# Patient Record
Sex: MALE | Race: OTHER | NOT HISPANIC OR LATINO | URBAN - METROPOLITAN AREA
[De-identification: names, ages, dates, MRNs, and addresses within clinical notes are randomized per-mention and may not be internally consistent; named-entity substitution may affect disease eponyms.]

---

## 2019-07-12 ENCOUNTER — EMERGENCY (EMERGENCY)
Facility: HOSPITAL | Age: 44
LOS: 1 days | Discharge: HOME | End: 2019-07-12
Attending: EMERGENCY MEDICINE
Payer: SELF-PAY

## 2019-07-12 VITALS — SYSTOLIC BLOOD PRESSURE: 162 MMHG | RESPIRATION RATE: 18 BRPM | DIASTOLIC BLOOD PRESSURE: 95 MMHG | HEART RATE: 63 BPM

## 2019-07-12 VITALS
TEMPERATURE: 98 F | OXYGEN SATURATION: 97 % | SYSTOLIC BLOOD PRESSURE: 203 MMHG | WEIGHT: 259.93 LBS | HEART RATE: 88 BPM | HEIGHT: 69 IN | RESPIRATION RATE: 18 BRPM | DIASTOLIC BLOOD PRESSURE: 126 MMHG

## 2019-07-12 PROCEDURE — 73030 X-RAY EXAM OF SHOULDER: CPT

## 2019-07-12 PROCEDURE — 73030 X-RAY EXAM OF SHOULDER: CPT | Mod: 26,LT

## 2019-07-12 PROCEDURE — 73130 X-RAY EXAM OF HAND: CPT | Mod: 26,LT

## 2019-07-12 PROCEDURE — 29125 APPL SHORT ARM SPLINT STATIC: CPT | Mod: LT

## 2019-07-12 PROCEDURE — 99283 EMERGENCY DEPT VISIT LOW MDM: CPT | Mod: 25

## 2019-07-12 PROCEDURE — 29125 APPL SHORT ARM SPLINT STATIC: CPT

## 2019-07-12 PROCEDURE — 73130 X-RAY EXAM OF HAND: CPT

## 2019-07-12 PROCEDURE — 73080 X-RAY EXAM OF ELBOW: CPT

## 2019-07-12 PROCEDURE — 73080 X-RAY EXAM OF ELBOW: CPT | Mod: 26,LT

## 2019-07-12 PROCEDURE — 99284 EMERGENCY DEPT VISIT MOD MDM: CPT | Mod: 25

## 2019-07-12 RX ORDER — IBUPROFEN 200 MG
600 TABLET ORAL ONCE
Refills: 0 | Status: COMPLETED | OUTPATIENT
Start: 2019-07-12 | End: 2019-07-12

## 2019-07-12 RX ORDER — ACETAMINOPHEN 500 MG
975 TABLET ORAL ONCE
Refills: 0 | Status: COMPLETED | OUTPATIENT
Start: 2019-07-12 | End: 2019-07-12

## 2019-07-12 RX ADMIN — Medication 600 MILLIGRAM(S): at 17:35

## 2019-07-12 RX ADMIN — Medication 975 MILLIGRAM(S): at 17:35
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
## 2019-07-12 NOTE — ED PROCEDURE NOTE - NS ED PERI NEURO NEG
H&P reviewed. The patient was examined and there are no changes to the H&P.       The patient/caregiver verbalized understanding of how to care for the injured extremity with splint/Pre-application: Motor, sensory, and vascular responses intact in the injured extremity./Post-application: Motor, sensory, and vascular responses intact in the injured extremity.
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
## 2019-07-12 NOTE — ED PROVIDER NOTE - NSFOLLOWUPINSTRUCTIONS_ED_ALL_ED_FT
Ice to pain area.  Elevate the affected area.  Keep the splint.  Take Motrin (Advil or Ibuprofen) 600mg every 8hours for pain with food.  Tylenol 500mg or 650mg every 6hours for pain as needed.  Follow up with your orthopedist or Dr. on a list, call Monday for an appointment.   Return for any concerns or worsening symptoms.
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
## 2019-07-13 NOTE — ED POST DISCHARGE NOTE - DETAILS
7/13/19: attempted to contact pt regarding xray discrepancy as above and advised return to ED for shoulder sling placement. No answer, left voice message with callback to admin line. - Jone Suggs PA-C 7/14/19: Left voicemail informing that I had results to discuss with the patient, gave call back number. Called emergency contact #([phone]) and spoke with patient's daughter who wrote down number for admin line to relay to her father. -Karri Dubois PA-C no answer, will send certified letter - Deepali Patino PA-C

## 2021-07-14 ENCOUNTER — RX RENEWAL (OUTPATIENT)
Age: 46
End: 2021-07-14

## 2021-07-14 PROBLEM — Z00.00 ENCOUNTER FOR PREVENTIVE HEALTH EXAMINATION: Status: ACTIVE | Noted: 2021-07-14

## 2021-08-30 ENCOUNTER — APPOINTMENT (OUTPATIENT)
Dept: INTERNAL MEDICINE | Facility: CLINIC | Age: 46
End: 2021-08-30
Payer: MEDICAID

## 2021-08-30 VITALS
SYSTOLIC BLOOD PRESSURE: 146 MMHG | HEIGHT: 67 IN | TEMPERATURE: 97.9 F | BODY MASS INDEX: 45.08 KG/M2 | HEART RATE: 75 BPM | OXYGEN SATURATION: 98 % | RESPIRATION RATE: 17 BRPM | DIASTOLIC BLOOD PRESSURE: 92 MMHG | WEIGHT: 287.25 LBS

## 2021-08-30 DIAGNOSIS — I25.2 OLD MYOCARDIAL INFARCTION: ICD-10-CM

## 2021-08-30 DIAGNOSIS — Z78.9 OTHER SPECIFIED HEALTH STATUS: ICD-10-CM

## 2021-08-30 DIAGNOSIS — R93.89 ABNORMAL FINDINGS ON DIAGNOSTIC IMAGING OF OTHER SPECIFIED BODY STRUCTURES: ICD-10-CM

## 2021-08-30 PROCEDURE — 99214 OFFICE O/P EST MOD 30 MIN: CPT | Mod: 25

## 2021-08-30 PROCEDURE — 78268 UREA BREATH TEST C-14 ALYS: CPT

## 2021-08-30 RX ORDER — DOCUSATE SODIUM 100 MG/1
100 CAPSULE ORAL TWICE DAILY
Qty: 60 | Refills: 1 | Status: ACTIVE | COMMUNITY
Start: 2021-08-30 | End: 1900-01-01

## 2021-09-02 LAB — UREA BREATH TEST QL: NEGATIVE

## 2021-09-02 NOTE — HISTORY OF PRESENT ILLNESS
[FreeTextEntry1] : follow up [de-identified] : 44 y/o male presents with concerns for frequent episodes of GERD and constipation. Patient has a history of H-Pylori infection. He reports having a bowel movement every 1-2 weeks. He reports his diet consists of mainly rice and chicken- minimal fiber. He feels otherwise well and offers no other acute complaints or concerns. \par

## 2021-09-02 NOTE — REVIEW OF SYSTEMS
[Constipation] : constipation [Heartburn] : heartburn [Fever] : no fever [Chills] : no chills [Recent Change In Weight] : ~T no recent weight change [Vision Problems] : no vision problems [Earache] : no earache [Nasal Discharge] : no nasal discharge [Sore Throat] : no sore throat [Palpitations] : no palpitations [Chest Pain] : no chest pain [Shortness Of Breath] : no shortness of breath [Wheezing] : no wheezing [Abdominal Pain] : no abdominal pain [Nausea] : no nausea [Diarrhea] : no diarrhea [Vomiting] : no vomiting [Dysuria] : no dysuria [Hesitancy] : no hesitancy [Hematuria] : no hematuria [Frequency] : no frequency [Joint Pain] : no joint pain [Joint Swelling] : no joint swelling [Skin Rash] : no skin rash [Headache] : no headache [Dizziness] : no dizziness [Anxiety] : no anxiety [Depression] : no depression [Swollen Glands] : no swollen glands

## 2021-09-07 ENCOUNTER — LABORATORY RESULT (OUTPATIENT)
Age: 46
End: 2021-09-07

## 2021-09-07 ENCOUNTER — APPOINTMENT (OUTPATIENT)
Dept: INTERNAL MEDICINE | Facility: CLINIC | Age: 46
End: 2021-09-07
Payer: MEDICAID

## 2021-09-07 VITALS
HEART RATE: 76 BPM | WEIGHT: 288 LBS | SYSTOLIC BLOOD PRESSURE: 118 MMHG | HEIGHT: 69 IN | OXYGEN SATURATION: 97 % | BODY MASS INDEX: 42.65 KG/M2 | DIASTOLIC BLOOD PRESSURE: 78 MMHG | TEMPERATURE: 96.7 F | RESPIRATION RATE: 17 BRPM

## 2021-09-07 DIAGNOSIS — Z13.39 ENCOUNTER FOR SCREENING EXAM FOR OTHER MENTAL HEALTH AND BEHAVIORAL DISORDERS: ICD-10-CM

## 2021-09-07 DIAGNOSIS — Z13.31 ENCOUNTER FOR SCREENING FOR DEPRESSION: ICD-10-CM

## 2021-09-07 PROCEDURE — G0447 BEHAVIOR COUNSEL OBESITY 15M: CPT

## 2021-09-07 PROCEDURE — 93000 ELECTROCARDIOGRAM COMPLETE: CPT | Mod: 59

## 2021-09-07 PROCEDURE — 99396 PREV VISIT EST AGE 40-64: CPT | Mod: 25

## 2021-09-07 PROCEDURE — G0442 ANNUAL ALCOHOL SCREEN 15 MIN: CPT

## 2021-09-07 PROCEDURE — G0444 DEPRESSION SCREEN ANNUAL: CPT | Mod: 59

## 2021-09-07 PROCEDURE — 99215 OFFICE O/P EST HI 40 MIN: CPT | Mod: 25

## 2021-09-10 LAB
25(OH)D3 SERPL-MCNC: 26.9 NG/ML
ALBUMIN SERPL ELPH-MCNC: 4.5 G/DL
ALP BLD-CCNC: 185 U/L
ALT SERPL-CCNC: 84 U/L
ANION GAP SERPL CALC-SCNC: 11 MMOL/L
APPEARANCE: CLEAR
AST SERPL-CCNC: 42 U/L
BASOPHILS # BLD AUTO: 0.07 K/UL
BASOPHILS NFR BLD AUTO: 0.9 %
BILIRUB SERPL-MCNC: 0.4 MG/DL
BILIRUBIN URINE: NEGATIVE
BLOOD URINE: NORMAL
BUN SERPL-MCNC: 15 MG/DL
CALCIUM SERPL-MCNC: 9.5 MG/DL
CHLORIDE SERPL-SCNC: 101 MMOL/L
CHOLEST SERPL-MCNC: 153 MG/DL
CO2 SERPL-SCNC: 28 MMOL/L
COLOR: YELLOW
CREAT SERPL-MCNC: 1.04 MG/DL
EOSINOPHIL # BLD AUTO: 0.41 K/UL
EOSINOPHIL NFR BLD AUTO: 5.2 %
ESTIMATED AVERAGE GLUCOSE: 171 MG/DL
GLUCOSE QUALITATIVE U: NEGATIVE
GLUCOSE SERPL-MCNC: 145 MG/DL
HBA1C MFR BLD HPLC: 7.6 %
HCT VFR BLD CALC: 50 %
HDLC SERPL-MCNC: 33 MG/DL
HGB BLD-MCNC: 16.1 G/DL
KETONES URINE: NEGATIVE
LDLC SERPL CALC-MCNC: 85 MG/DL
LEUKOCYTE ESTERASE URINE: NEGATIVE
LYMPHOCYTES # BLD AUTO: 3.8 K/UL
LYMPHOCYTES NFR BLD AUTO: 47.8 %
MAN DIFF?: NORMAL
MCHC RBC-ENTMCNC: 28.7 PG
MCHC RBC-ENTMCNC: 32.2 GM/DL
MCV RBC AUTO: 89.1 FL
MONOCYTES # BLD AUTO: 0.21 K/UL
MONOCYTES NFR BLD AUTO: 2.6 %
NEUTROPHILS # BLD AUTO: 3.32 K/UL
NEUTROPHILS NFR BLD AUTO: 41.7 %
NITRITE URINE: NEGATIVE
NONHDLC SERPL-MCNC: 120 MG/DL
PH URINE: 5.5
PLATELET # BLD AUTO: 250 K/UL
POTASSIUM SERPL-SCNC: 4.2 MMOL/L
PROT SERPL-MCNC: 7.8 G/DL
PROTEIN URINE: ABNORMAL
PSA FREE FLD-MCNC: 54 %
PSA FREE SERPL-MCNC: 0.29 NG/ML
PSA SERPL-MCNC: 0.53 NG/ML
RBC # BLD: 5.61 M/UL
RBC # FLD: 13.5 %
SODIUM SERPL-SCNC: 140 MMOL/L
SPECIFIC GRAVITY URINE: 1.03
TRIGL SERPL-MCNC: 177 MG/DL
TSH SERPL-ACNC: 1.99 UIU/ML
UROBILINOGEN URINE: NORMAL
WBC # FLD AUTO: 7.96 K/UL

## 2021-09-13 PROBLEM — Z13.31 DEPRESSION SCREEN: Status: ACTIVE | Noted: 2021-09-13

## 2021-09-13 PROBLEM — Z13.39 ALCOHOL SCREENING: Status: ACTIVE | Noted: 2021-09-13

## 2021-09-13 NOTE — HEALTH RISK ASSESSMENT
[No] : No [0] : 2) Feeling down, depressed, or hopeless: Not at all (0) [] : No [de-identified] : time spent 1 minute  [Audit-CScore] : 0 [de-identified] : time spent 1 minute  [[id number]] : 0

## 2021-09-13 NOTE — HISTORY OF PRESENT ILLNESS
[FreeTextEntry1] : annual wellness  [de-identified] : 44 y/o male presents for annual wellness exam. He states that his constipation has been improving since starting Colace. He reports having still having SBD pain and hasn't had a BM in 3 days. He has not yet follow up with a gastroenterologist. He feels otherwise well and offers no other acute complaints or concerns.

## 2021-09-13 NOTE — REVIEW OF SYSTEMS
[Constipation] : constipation [Heartburn] : heartburn [Abdominal Pain] : abdominal pain [Fever] : no fever [Chills] : no chills [Recent Change In Weight] : ~T no recent weight change [Vision Problems] : no vision problems [Earache] : no earache [Nasal Discharge] : no nasal discharge [Sore Throat] : no sore throat [Chest Pain] : no chest pain [Palpitations] : no palpitations [Shortness Of Breath] : no shortness of breath [Wheezing] : no wheezing [Nausea] : no nausea [Diarrhea] : no diarrhea [Vomiting] : no vomiting [Dysuria] : no dysuria [Hesitancy] : no hesitancy [Hematuria] : no hematuria [Frequency] : no frequency [Joint Pain] : no joint pain [Joint Swelling] : no joint swelling [Skin Rash] : no skin rash [Headache] : no headache [Dizziness] : no dizziness [Anxiety] : no anxiety [Depression] : no depression [Swollen Glands] : no swollen glands

## 2021-09-16 ENCOUNTER — APPOINTMENT (OUTPATIENT)
Dept: INTERNAL MEDICINE | Facility: CLINIC | Age: 46
End: 2021-09-16
Payer: MEDICAID

## 2021-09-16 VITALS
SYSTOLIC BLOOD PRESSURE: 152 MMHG | OXYGEN SATURATION: 97 % | HEIGHT: 69 IN | BODY MASS INDEX: 42.36 KG/M2 | HEART RATE: 83 BPM | DIASTOLIC BLOOD PRESSURE: 98 MMHG | TEMPERATURE: 97.5 F | WEIGHT: 286 LBS | RESPIRATION RATE: 18 BRPM

## 2021-09-16 DIAGNOSIS — N40.0 BENIGN PROSTATIC HYPERPLASIA WITHOUT LOWER URINARY TRACT SYMPMS: ICD-10-CM

## 2021-09-16 DIAGNOSIS — N20.0 CALCULUS OF KIDNEY: ICD-10-CM

## 2021-09-16 DIAGNOSIS — R60.0 LOCALIZED EDEMA: ICD-10-CM

## 2021-09-16 PROCEDURE — 99401 PREV MED CNSL INDIV APPRX 15: CPT

## 2021-09-16 PROCEDURE — 99214 OFFICE O/P EST MOD 30 MIN: CPT | Mod: 25

## 2021-09-21 PROBLEM — N20.0 LEFT RENAL STONE: Status: ACTIVE | Noted: 2021-08-30

## 2021-09-21 PROBLEM — N40.0 BPH (BENIGN PROSTATIC HYPERPLASIA): Status: ACTIVE | Noted: 2021-07-14

## 2021-09-21 PROBLEM — R60.0 LOWER LEG EDEMA: Status: ACTIVE | Noted: 2021-08-30

## 2021-09-22 NOTE — REVIEW OF SYSTEMS
[Heartburn] : heartburn [Fever] : no fever [Chills] : no chills [Recent Change In Weight] : ~T no recent weight change [Vision Problems] : no vision problems [Earache] : no earache [Nasal Discharge] : no nasal discharge [Sore Throat] : no sore throat [Chest Pain] : no chest pain [Palpitations] : no palpitations [Shortness Of Breath] : no shortness of breath [Wheezing] : no wheezing [Abdominal Pain] : no abdominal pain [Nausea] : no nausea [Constipation] : no constipation [Diarrhea] : no diarrhea [Vomiting] : no vomiting [Dysuria] : no dysuria [Hesitancy] : no hesitancy [Hematuria] : no hematuria [Frequency] : no frequency [Joint Pain] : no joint pain [Joint Swelling] : no joint swelling [Skin Rash] : no skin rash [Headache] : no headache [Dizziness] : no dizziness [Anxiety] : no anxiety [Depression] : no depression [Swollen Glands] : no swollen glands

## 2021-09-22 NOTE — HISTORY OF PRESENT ILLNESS
[FreeTextEntry1] : Follow up on his lab results  [de-identified] : 46 y/o male presents for follow up and review of his lab results from his recent wellness exam. Patient's diabetes worsened and he is   not on any medications currently. Patient presents to initiate treatment. He feels otherwise well and offers no other acute complaints or concerns.

## 2021-12-07 NOTE — ED ADULT NURSE NOTE - GENITOURINARY WDL
Impression: Myopia, bilateral: H52.13. Plan: Give rx for glasses and contacts. Rtc 1 yr.
Bladder non-tender and non-distended. Urine clear yellow.
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
## 2022-09-19 ENCOUNTER — NON-APPOINTMENT (OUTPATIENT)
Age: 47
End: 2022-09-19

## 2022-09-19 ENCOUNTER — LABORATORY RESULT (OUTPATIENT)
Age: 47
End: 2022-09-19

## 2022-09-19 ENCOUNTER — APPOINTMENT (OUTPATIENT)
Dept: INTERNAL MEDICINE | Facility: CLINIC | Age: 47
End: 2022-09-19

## 2022-09-19 VITALS
WEIGHT: 280 LBS | RESPIRATION RATE: 18 BRPM | HEART RATE: 72 BPM | HEIGHT: 69 IN | SYSTOLIC BLOOD PRESSURE: 162 MMHG | DIASTOLIC BLOOD PRESSURE: 90 MMHG | TEMPERATURE: 97.5 F | BODY MASS INDEX: 41.47 KG/M2 | OXYGEN SATURATION: 96 %

## 2022-09-19 DIAGNOSIS — M94.0 CHONDROCOSTAL JUNCTION SYNDROME [TIETZE]: ICD-10-CM

## 2022-09-19 DIAGNOSIS — R79.89 OTHER SPECIFIED ABNORMAL FINDINGS OF BLOOD CHEMISTRY: ICD-10-CM

## 2022-09-19 DIAGNOSIS — Z00.00 ENCOUNTER FOR GENERAL ADULT MEDICAL EXAMINATION W/OUT ABNORMAL FINDINGS: ICD-10-CM

## 2022-09-19 PROCEDURE — 99214 OFFICE O/P EST MOD 30 MIN: CPT | Mod: 25

## 2022-09-19 PROCEDURE — 93000 ELECTROCARDIOGRAM COMPLETE: CPT

## 2022-09-19 PROCEDURE — 36415 COLL VENOUS BLD VENIPUNCTURE: CPT

## 2022-09-19 PROCEDURE — G0447 BEHAVIOR COUNSEL OBESITY 15M: CPT

## 2022-09-19 PROCEDURE — 99396 PREV VISIT EST AGE 40-64: CPT | Mod: 25

## 2022-09-19 RX ORDER — LIDOCAINE 5% 700 MG/1
5 PATCH TOPICAL
Qty: 1 | Refills: 3 | Status: ACTIVE | COMMUNITY
Start: 2022-09-19 | End: 1900-01-01

## 2022-09-19 RX ORDER — DICLOFENAC SODIUM 50 MG/1
50 TABLET, DELAYED RELEASE ORAL DAILY
Qty: 15 | Refills: 0 | Status: ACTIVE | COMMUNITY
Start: 2022-09-19 | End: 1900-01-01

## 2022-09-23 LAB
25(OH)D3 SERPL-MCNC: 19.2 NG/ML
ALBUMIN SERPL ELPH-MCNC: 4.7 G/DL
ALP BLD-CCNC: 158 U/L
ALT SERPL-CCNC: 77 U/L
ANION GAP SERPL CALC-SCNC: 14 MMOL/L
APPEARANCE: CLEAR
AST SERPL-CCNC: 51 U/L
BASOPHILS # BLD AUTO: 0.05 K/UL
BASOPHILS NFR BLD AUTO: 0.6 %
BILIRUB SERPL-MCNC: 0.4 MG/DL
BILIRUBIN URINE: NEGATIVE
BLOOD URINE: NEGATIVE
BUN SERPL-MCNC: 18 MG/DL
CALCIUM SERPL-MCNC: 9.6 MG/DL
CHLORIDE SERPL-SCNC: 105 MMOL/L
CHOLEST SERPL-MCNC: 186 MG/DL
CO2 SERPL-SCNC: 23 MMOL/L
COLOR: NORMAL
CREAT SERPL-MCNC: 0.98 MG/DL
EGFR: 96 ML/MIN/1.73M2
EOSINOPHIL # BLD AUTO: 0.12 K/UL
EOSINOPHIL NFR BLD AUTO: 1.4 %
ESTIMATED AVERAGE GLUCOSE: 163 MG/DL
GLUCOSE QUALITATIVE U: NEGATIVE
GLUCOSE SERPL-MCNC: 126 MG/DL
HBA1C MFR BLD HPLC: 7.3 %
HCT VFR BLD CALC: 50.4 %
HDLC SERPL-MCNC: 37 MG/DL
HGB BLD-MCNC: 16.4 G/DL
IMM GRANULOCYTES NFR BLD AUTO: 0.3 %
KETONES URINE: NEGATIVE
LDLC SERPL CALC-MCNC: 103 MG/DL
LEUKOCYTE ESTERASE URINE: NEGATIVE
LYMPHOCYTES # BLD AUTO: 2.29 K/UL
LYMPHOCYTES NFR BLD AUTO: 26.5 %
MAN DIFF?: NORMAL
MCHC RBC-ENTMCNC: 29.3 PG
MCHC RBC-ENTMCNC: 32.5 GM/DL
MCV RBC AUTO: 90 FL
MONOCYTES # BLD AUTO: 0.68 K/UL
MONOCYTES NFR BLD AUTO: 7.9 %
NEUTROPHILS # BLD AUTO: 5.48 K/UL
NEUTROPHILS NFR BLD AUTO: 63.3 %
NITRITE URINE: NEGATIVE
NONHDLC SERPL-MCNC: 149 MG/DL
PH URINE: 6.5
PLATELET # BLD AUTO: 233 K/UL
POTASSIUM SERPL-SCNC: 4.1 MMOL/L
PROT SERPL-MCNC: 7.8 G/DL
PROTEIN URINE: ABNORMAL
PSA FREE FLD-MCNC: 43 %
PSA FREE SERPL-MCNC: 0.36 NG/ML
PSA SERPL-MCNC: 0.83 NG/ML
RBC # BLD: 5.6 M/UL
RBC # FLD: 13.7 %
SODIUM SERPL-SCNC: 142 MMOL/L
SPECIFIC GRAVITY URINE: 1.02
TRIGL SERPL-MCNC: 231 MG/DL
TSH SERPL-ACNC: 1.4 UIU/ML
UROBILINOGEN URINE: NORMAL
WBC # FLD AUTO: 8.65 K/UL

## 2022-09-26 PROBLEM — R79.89 LFT ELEVATION: Status: ACTIVE | Noted: 2021-08-30

## 2022-09-26 PROBLEM — M94.0 COSTOCHONDRITIS: Status: ACTIVE | Noted: 2022-09-19

## 2022-09-26 PROBLEM — Z00.00 PREVENTATIVE HEALTH CARE: Status: RESOLVED | Noted: 2021-09-07 | Resolved: 2022-09-19

## 2022-09-26 NOTE — HISTORY OF PRESENT ILLNESS
[de-identified] : 45 yo M presents for annual Wellness Exam.\par He reports chest pain.  EKG normal.  tender to touch over ribs. costochondritis. \par followup with Cardio scheduled for next week\par Bariatric surgery referral to address morbid obesity\par DM2-  add [email or web address]

## 2022-09-26 NOTE — REVIEW OF SYSTEMS
[Chest Pain] : chest pain [Fever] : no fever [Chills] : no chills [Recent Change In Weight] : ~T no recent weight change [Vision Problems] : no vision problems [Earache] : no earache [Nasal Discharge] : no nasal discharge [Sore Throat] : no sore throat [Palpitations] : no palpitations [Shortness Of Breath] : no shortness of breath [Wheezing] : no wheezing [Abdominal Pain] : no abdominal pain [Nausea] : no nausea [Constipation] : no constipation [Diarrhea] : no diarrhea [Vomiting] : no vomiting [Heartburn] : no heartburn [Dysuria] : no dysuria [Hesitancy] : no hesitancy [Hematuria] : no hematuria [Frequency] : no frequency [Joint Swelling] : no joint swelling [Skin Rash] : no skin rash [Headache] : no headache [Dizziness] : no dizziness [Anxiety] : no anxiety [Depression] : no depression [Swollen Glands] : no swollen glands [FreeTextEntry9] : rib tenderness left side

## 2022-09-26 NOTE — PHYSICAL EXAM
[No Acute Distress] : no acute distress [Well Nourished] : well nourished [Well Developed] : well developed [Well-Appearing] : well-appearing [Normal Sclera/Conjunctiva] : normal sclera/conjunctiva [PERRL] : pupils equal round and reactive to light [EOMI] : extraocular movements intact [Normal Outer Ear/Nose] : the outer ears and nose were normal in appearance [Normal Oropharynx] : the oropharynx was normal [No JVD] : no jugular venous distention [No Lymphadenopathy] : no lymphadenopathy [Supple] : supple [Thyroid Normal, No Nodules] : the thyroid was normal and there were no nodules present [No Respiratory Distress] : no respiratory distress  [No Accessory Muscle Use] : no accessory muscle use [Clear to Auscultation] : lungs were clear to auscultation bilaterally [Normal Rate] : normal rate  [Regular Rhythm] : with a regular rhythm [Normal S1, S2] : normal S1 and S2 [No Murmur] : no murmur heard [No Carotid Bruits] : no carotid bruits [No Abdominal Bruit] : a ~M bruit was not heard ~T in the abdomen [No Varicosities] : no varicosities [Pedal Pulses Present] : the pedal pulses are present [No Edema] : there was no peripheral edema [No Palpable Aorta] : no palpable aorta [No Extremity Clubbing/Cyanosis] : no extremity clubbing/cyanosis [Soft] : abdomen soft [Non Tender] : non-tender [Non-distended] : non-distended [No Masses] : no abdominal mass palpated [No HSM] : no HSM [Normal Bowel Sounds] : normal bowel sounds [Normal Posterior Cervical Nodes] : no posterior cervical lymphadenopathy [Normal Anterior Cervical Nodes] : no anterior cervical lymphadenopathy [No CVA Tenderness] : no CVA  tenderness [No Spinal Tenderness] : no spinal tenderness [No Rash] : no rash [Coordination Grossly Intact] : coordination grossly intact [No Focal Deficits] : no focal deficits [Normal Gait] : normal gait [Deep Tendon Reflexes (DTR)] : deep tendon reflexes were 2+ and symmetric [Normal Affect] : the affect was normal [Normal Insight/Judgement] : insight and judgment were intact [de-identified] : rib tenderness left chest mid axillary area

## 2022-10-04 ENCOUNTER — APPOINTMENT (OUTPATIENT)
Dept: INTERNAL MEDICINE | Facility: CLINIC | Age: 47
End: 2022-10-04

## 2022-10-10 ENCOUNTER — RX RENEWAL (OUTPATIENT)
Age: 47
End: 2022-10-10

## 2023-01-06 ENCOUNTER — APPOINTMENT (OUTPATIENT)
Dept: INTERNAL MEDICINE | Facility: CLINIC | Age: 48
End: 2023-01-06
Payer: MEDICAID

## 2023-01-06 VITALS
OXYGEN SATURATION: 97 % | HEART RATE: 68 BPM | SYSTOLIC BLOOD PRESSURE: 140 MMHG | BODY MASS INDEX: 41.47 KG/M2 | WEIGHT: 280 LBS | DIASTOLIC BLOOD PRESSURE: 90 MMHG | HEIGHT: 69 IN | TEMPERATURE: 97.3 F | RESPIRATION RATE: 17 BRPM

## 2023-01-06 DIAGNOSIS — J06.9 ACUTE UPPER RESPIRATORY INFECTION, UNSPECIFIED: ICD-10-CM

## 2023-01-06 PROCEDURE — 99213 OFFICE O/P EST LOW 20 MIN: CPT

## 2023-01-09 ENCOUNTER — NON-APPOINTMENT (OUTPATIENT)
Age: 48
End: 2023-01-09

## 2023-01-09 LAB
RAPID RVP RESULT: NOT DETECTED
SARS-COV-2 RNA PNL RESP NAA+PROBE: NOT DETECTED

## 2023-01-17 PROBLEM — J06.9 URI, ACUTE: Status: RESOLVED | Noted: 2023-01-06 | Resolved: 2023-02-05

## 2023-01-17 NOTE — HISTORY OF PRESENT ILLNESS
[FreeTextEntry8] : 48 y/o M presents with a chief complaint of pharyngitis\par He states he is otherwise well and voices no further complaints.\par ROS as documented below.

## 2023-01-17 NOTE — PHYSICAL EXAM
[No Acute Distress] : no acute distress [Well Nourished] : well nourished [Well Developed] : well developed [Well-Appearing] : well-appearing [Normal Sclera/Conjunctiva] : normal sclera/conjunctiva [PERRL] : pupils equal round and reactive to light [EOMI] : extraocular movements intact [Normal Outer Ear/Nose] : the outer ears and nose were normal in appearance [No JVD] : no jugular venous distention [No Lymphadenopathy] : no lymphadenopathy [Supple] : supple [Thyroid Normal, No Nodules] : the thyroid was normal and there were no nodules present [No Respiratory Distress] : no respiratory distress  [No Accessory Muscle Use] : no accessory muscle use [Clear to Auscultation] : lungs were clear to auscultation bilaterally [Normal Rate] : normal rate  [Regular Rhythm] : with a regular rhythm [Normal S1, S2] : normal S1 and S2 [No Murmur] : no murmur heard [No Carotid Bruits] : no carotid bruits [No Abdominal Bruit] : a ~M bruit was not heard ~T in the abdomen [No Varicosities] : no varicosities [Pedal Pulses Present] : the pedal pulses are present [No Edema] : there was no peripheral edema [No Palpable Aorta] : no palpable aorta [No Extremity Clubbing/Cyanosis] : no extremity clubbing/cyanosis [Soft] : abdomen soft [Non Tender] : non-tender [Non-distended] : non-distended [No Masses] : no abdominal mass palpated [No HSM] : no HSM [Normal Bowel Sounds] : normal bowel sounds [Normal Posterior Cervical Nodes] : no posterior cervical lymphadenopathy [Normal Anterior Cervical Nodes] : no anterior cervical lymphadenopathy [No CVA Tenderness] : no CVA  tenderness [No Spinal Tenderness] : no spinal tenderness [No Joint Swelling] : no joint swelling [Grossly Normal Strength/Tone] : grossly normal strength/tone [No Rash] : no rash [Coordination Grossly Intact] : coordination grossly intact [No Focal Deficits] : no focal deficits [Normal Gait] : normal gait [Deep Tendon Reflexes (DTR)] : deep tendon reflexes were 2+ and symmetric [Normal Affect] : the affect was normal [Normal Insight/Judgement] : insight and judgment were intact [de-identified] : mild erythema of  the posterior pharynx noted

## 2023-02-03 ENCOUNTER — RX RENEWAL (OUTPATIENT)
Age: 48
End: 2023-02-03

## 2023-06-22 ENCOUNTER — APPOINTMENT (OUTPATIENT)
Dept: INTERNAL MEDICINE | Facility: CLINIC | Age: 48
End: 2023-06-22
Payer: MEDICAID

## 2023-06-22 VITALS
TEMPERATURE: 97.4 F | HEIGHT: 68 IN | HEART RATE: 80 BPM | DIASTOLIC BLOOD PRESSURE: 100 MMHG | BODY MASS INDEX: 41.98 KG/M2 | SYSTOLIC BLOOD PRESSURE: 144 MMHG | OXYGEN SATURATION: 97 % | WEIGHT: 277 LBS | RESPIRATION RATE: 17 BRPM

## 2023-06-22 DIAGNOSIS — M54.50 LOW BACK PAIN, UNSPECIFIED: ICD-10-CM

## 2023-06-22 DIAGNOSIS — K59.00 CONSTIPATION, UNSPECIFIED: ICD-10-CM

## 2023-06-22 PROCEDURE — 99214 OFFICE O/P EST MOD 30 MIN: CPT

## 2023-06-23 PROBLEM — M54.50 LOW BACK PAIN: Status: ACTIVE | Noted: 2023-06-22

## 2023-06-23 PROBLEM — K59.00 CONSTIPATION: Status: ACTIVE | Noted: 2021-08-30

## 2023-06-23 NOTE — PHYSICAL EXAM
[No Acute Distress] : no acute distress [Well Nourished] : well nourished [Well Developed] : well developed [Well-Appearing] : well-appearing [Normal Sclera/Conjunctiva] : normal sclera/conjunctiva [PERRL] : pupils equal round and reactive to light [EOMI] : extraocular movements intact [Normal Outer Ear/Nose] : the outer ears and nose were normal in appearance [Normal Oropharynx] : the oropharynx was normal [Normal TMs] : both tympanic membranes were normal [No JVD] : no jugular venous distention [No Lymphadenopathy] : no lymphadenopathy [Supple] : supple [Thyroid Normal, No Nodules] : the thyroid was normal and there were no nodules present [No Respiratory Distress] : no respiratory distress  [No Accessory Muscle Use] : no accessory muscle use [Clear to Auscultation] : lungs were clear to auscultation bilaterally [Normal Rate] : normal rate  [Regular Rhythm] : with a regular rhythm [Normal S1, S2] : normal S1 and S2 [No Murmur] : no murmur heard [No Carotid Bruits] : no carotid bruits [No Abdominal Bruit] : a ~M bruit was not heard ~T in the abdomen [No Varicosities] : no varicosities [Pedal Pulses Present] : the pedal pulses are present [No Edema] : there was no peripheral edema [No Palpable Aorta] : no palpable aorta [No Extremity Clubbing/Cyanosis] : no extremity clubbing/cyanosis [Soft] : abdomen soft [Non Tender] : non-tender [Non-distended] : non-distended [No Masses] : no abdominal mass palpated [No HSM] : no HSM [Normal Bowel Sounds] : normal bowel sounds [No CVA Tenderness] : no CVA  tenderness [No Joint Swelling] : no joint swelling [No Spinal Tenderness] : no spinal tenderness [Grossly Normal Strength/Tone] : grossly normal strength/tone [No Rash] : no rash [Coordination Grossly Intact] : coordination grossly intact [No Focal Deficits] : no focal deficits [Normal Gait] : normal gait [Deep Tendon Reflexes (DTR)] : deep tendon reflexes were 2+ and symmetric [Normal Affect] : the affect was normal [Normal Insight/Judgement] : insight and judgment were intact

## 2023-06-27 ENCOUNTER — RX RENEWAL (OUTPATIENT)
Age: 48
End: 2023-06-27

## 2023-07-03 NOTE — END OF VISIT
[FreeTextEntry4] : I Steff Granado am scribing for and in the presence of Dr. Frantz Driscoll, the following sections: HISTORY OF PRESENT ILLNESS, PAST MEDICAL/FAMILY/SOCIAL HISTORY, REVIEW OF SYSTEMS, PHYSICAL EXAM; DISPOSITION.\par \par I personally performed the services described in the documentation, reviewed the documentation recorded by the scribe in my presence and it accurately and completely records my words and actions.

## 2023-07-03 NOTE — HISTORY OF PRESENT ILLNESS
[FreeTextEntry1] : hospital discharge follow up [de-identified] : 46 y/o male presents for ER discharge follow up. He was seen for severe low back pain. He needs an orthopedic referral. Patient also reports frequent constipation. He has been taking Colace and a laxative. He states he has been following with Cardio for his CHF. He feels otherwise well and reports no other acute complaints or concerns. ROS as documented below.\par

## 2023-08-01 ENCOUNTER — RX RENEWAL (OUTPATIENT)
Age: 48
End: 2023-08-01

## 2023-08-11 ENCOUNTER — RX RENEWAL (OUTPATIENT)
Age: 48
End: 2023-08-11

## 2023-09-13 ENCOUNTER — RX RENEWAL (OUTPATIENT)
Age: 48
End: 2023-09-13

## 2023-09-18 ENCOUNTER — RX RENEWAL (OUTPATIENT)
Age: 48
End: 2023-09-18

## 2023-10-26 ENCOUNTER — LABORATORY RESULT (OUTPATIENT)
Age: 48
End: 2023-10-26

## 2023-10-26 ENCOUNTER — APPOINTMENT (OUTPATIENT)
Dept: INTERNAL MEDICINE | Facility: CLINIC | Age: 48
End: 2023-10-26
Payer: MEDICAID

## 2023-10-26 VITALS
BODY MASS INDEX: 41.98 KG/M2 | OXYGEN SATURATION: 99 % | SYSTOLIC BLOOD PRESSURE: 160 MMHG | RESPIRATION RATE: 17 BRPM | DIASTOLIC BLOOD PRESSURE: 100 MMHG | HEIGHT: 68 IN | TEMPERATURE: 97.6 F | WEIGHT: 277 LBS | HEART RATE: 93 BPM

## 2023-10-26 DIAGNOSIS — E55.9 VITAMIN D DEFICIENCY, UNSPECIFIED: ICD-10-CM

## 2023-10-26 DIAGNOSIS — E78.5 HYPERLIPIDEMIA, UNSPECIFIED: ICD-10-CM

## 2023-10-26 DIAGNOSIS — H54.7 UNSPECIFIED VISUAL LOSS: ICD-10-CM

## 2023-10-26 DIAGNOSIS — E66.01 MORBID (SEVERE) OBESITY DUE TO EXCESS CALORIES: ICD-10-CM

## 2023-10-26 DIAGNOSIS — Z00.00 ENCOUNTER FOR GENERAL ADULT MEDICAL EXAMINATION W/OUT ABNORMAL FINDINGS: ICD-10-CM

## 2023-10-26 PROCEDURE — 99214 OFFICE O/P EST MOD 30 MIN: CPT | Mod: 25

## 2023-10-26 PROCEDURE — 99396 PREV VISIT EST AGE 40-64: CPT | Mod: 25

## 2023-10-26 PROCEDURE — 36415 COLL VENOUS BLD VENIPUNCTURE: CPT

## 2023-10-26 PROCEDURE — G0447 BEHAVIOR COUNSEL OBESITY 15M: CPT | Mod: 59

## 2023-11-02 PROBLEM — E78.5 HYPERLIPEMIA: Status: ACTIVE | Noted: 2021-08-30

## 2023-11-02 PROBLEM — H54.7 DECREASED VISION: Status: ACTIVE | Noted: 2023-10-26

## 2023-11-02 PROBLEM — Z00.00 PREVENTATIVE HEALTH CARE: Status: ACTIVE | Noted: 2022-09-19

## 2023-11-02 PROBLEM — E55.9 VITAMIN D DEFICIENCY: Status: ACTIVE | Noted: 2023-11-02

## 2023-11-03 ENCOUNTER — RX RENEWAL (OUTPATIENT)
Age: 48
End: 2023-11-03

## 2023-11-03 LAB
25(OH)D3 SERPL-MCNC: 13.4 NG/ML
ALBUMIN SERPL ELPH-MCNC: 4.2 G/DL
ALP BLD-CCNC: 157 U/L
ALT SERPL-CCNC: 95 U/L
ANION GAP SERPL CALC-SCNC: 10 MMOL/L
APPEARANCE: CLEAR
AST SERPL-CCNC: 63 U/L
BASOPHILS # BLD AUTO: 0.06 K/UL
BASOPHILS NFR BLD AUTO: 0.7 %
BILIRUB SERPL-MCNC: 0.3 MG/DL
BILIRUBIN URINE: NEGATIVE
BLOOD URINE: NEGATIVE
BUN SERPL-MCNC: 13 MG/DL
CALCIUM SERPL-MCNC: 9.5 MG/DL
CHLORIDE SERPL-SCNC: 99 MMOL/L
CHOLEST SERPL-MCNC: 164 MG/DL
CO2 SERPL-SCNC: 28 MMOL/L
COLOR: YELLOW
CREAT SERPL-MCNC: 1.19 MG/DL
EGFR: 76 ML/MIN/1.73M2
EOSINOPHIL # BLD AUTO: 0.27 K/UL
EOSINOPHIL NFR BLD AUTO: 3.1 %
ESTIMATED AVERAGE GLUCOSE: 194 MG/DL
GLUCOSE QUALITATIVE U: 100 MG/DL
GLUCOSE SERPL-MCNC: 187 MG/DL
HBA1C MFR BLD HPLC: 8.4 %
HCT VFR BLD CALC: 47 %
HDLC SERPL-MCNC: 28 MG/DL
HGB BLD-MCNC: 15.4 G/DL
IMM GRANULOCYTES NFR BLD AUTO: 0.3 %
KETONES URINE: NEGATIVE MG/DL
LDLC SERPL CALC-MCNC: 61 MG/DL
LEUKOCYTE ESTERASE URINE: NEGATIVE
LYMPHOCYTES # BLD AUTO: 3.17 K/UL
LYMPHOCYTES NFR BLD AUTO: 36.8 %
MAN DIFF?: NORMAL
MCHC RBC-ENTMCNC: 29.4 PG
MCHC RBC-ENTMCNC: 32.8 GM/DL
MCV RBC AUTO: 89.7 FL
MONOCYTES # BLD AUTO: 0.61 K/UL
MONOCYTES NFR BLD AUTO: 7.1 %
NEUTROPHILS # BLD AUTO: 4.47 K/UL
NEUTROPHILS NFR BLD AUTO: 52 %
NITRITE URINE: NEGATIVE
NONHDLC SERPL-MCNC: 136 MG/DL
PH URINE: 6.5
PLATELET # BLD AUTO: 236 K/UL
POTASSIUM SERPL-SCNC: 4.5 MMOL/L
PROT SERPL-MCNC: 7.4 G/DL
PROTEIN URINE: 30 MG/DL
PSA FREE FLD-MCNC: 40 %
PSA FREE SERPL-MCNC: 0.34 NG/ML
PSA SERPL-MCNC: 0.86 NG/ML
RBC # BLD: 5.24 M/UL
RBC # FLD: 13.1 %
SODIUM SERPL-SCNC: 138 MMOL/L
SPECIFIC GRAVITY URINE: 1.01
TRIGL SERPL-MCNC: 486 MG/DL
TSH SERPL-ACNC: 1.28 UIU/ML
UROBILINOGEN URINE: 1 MG/DL
WBC # FLD AUTO: 8.61 K/UL

## 2023-11-09 PROBLEM — E66.01 MORBID OBESITY: Status: ACTIVE | Noted: 2021-08-30

## 2023-12-21 ENCOUNTER — RX RENEWAL (OUTPATIENT)
Age: 48
End: 2023-12-21

## 2024-01-26 ENCOUNTER — APPOINTMENT (OUTPATIENT)
Dept: INTERNAL MEDICINE | Facility: CLINIC | Age: 49
End: 2024-01-26
Payer: COMMERCIAL

## 2024-01-26 VITALS
HEART RATE: 80 BPM | SYSTOLIC BLOOD PRESSURE: 170 MMHG | HEIGHT: 68 IN | BODY MASS INDEX: 41.98 KG/M2 | WEIGHT: 277 LBS | DIASTOLIC BLOOD PRESSURE: 100 MMHG | OXYGEN SATURATION: 97 % | TEMPERATURE: 97.5 F | RESPIRATION RATE: 17 BRPM

## 2024-01-26 DIAGNOSIS — Z90.49 ACQUIRED ABSENCE OF OTHER SPECIFIED PARTS OF DIGESTIVE TRACT: ICD-10-CM

## 2024-01-26 DIAGNOSIS — N50.82 SCROTAL PAIN: ICD-10-CM

## 2024-01-26 PROCEDURE — G2211 COMPLEX E/M VISIT ADD ON: CPT

## 2024-01-26 PROCEDURE — 99214 OFFICE O/P EST MOD 30 MIN: CPT

## 2024-01-29 DIAGNOSIS — N43.3 HYDROCELE, UNSPECIFIED: ICD-10-CM

## 2024-01-31 LAB — DEPRECATED O AND P PREP STL: NORMAL

## 2024-02-01 PROBLEM — N50.82 SCROTAL PAIN: Status: ACTIVE | Noted: 2024-01-26

## 2024-02-01 PROBLEM — Z90.49 STATUS POST APPENDECTOMY: Status: ACTIVE | Noted: 2024-02-01

## 2024-02-01 LAB
C DIFF TOXIN B QL PCR REFLEX: NORMAL
GDH ANTIGEN: NOT DETECTED
TOXIN A AND B: NOT DETECTED

## 2024-02-02 LAB — BACTERIA STL CULT: NORMAL

## 2024-02-19 NOTE — END OF VISIT
[FreeTextEntry4] : I Kedar Colin am scribing for and in the presence of Dr. Frantz Driscoll, the following sections: HISTORY OF PRESENT ILLNESS, PAST MEDICAL/FAMILY/SOCIAL HISTORY, REVIEW OF SYSTEMS, PHYSICAL EXAM; DISPOSITION.  I personally performed the services described in the documentation, reviewed the documentation recorded by the scribe in my presence and it accurately and completely records my words and actions.

## 2024-02-19 NOTE — PHYSICAL EXAM
[No Acute Distress] : no acute distress [Well Nourished] : well nourished [Well Developed] : well developed [Well-Appearing] : well-appearing [Normal Sclera/Conjunctiva] : normal sclera/conjunctiva [PERRL] : pupils equal round and reactive to light [EOMI] : extraocular movements intact [Normal Outer Ear/Nose] : the outer ears and nose were normal in appearance [Normal Oropharynx] : the oropharynx was normal [No JVD] : no jugular venous distention [No Lymphadenopathy] : no lymphadenopathy [Supple] : supple [Thyroid Normal, No Nodules] : the thyroid was normal and there were no nodules present [No Respiratory Distress] : no respiratory distress  [No Accessory Muscle Use] : no accessory muscle use [Clear to Auscultation] : lungs were clear to auscultation bilaterally [Normal Rate] : normal rate  [Regular Rhythm] : with a regular rhythm [Normal S1, S2] : normal S1 and S2 [No Murmur] : no murmur heard [No Carotid Bruits] : no carotid bruits [No Abdominal Bruit] : a ~M bruit was not heard ~T in the abdomen [No Varicosities] : no varicosities [Pedal Pulses Present] : the pedal pulses are present [No Edema] : there was no peripheral edema [No Palpable Aorta] : no palpable aorta [No Extremity Clubbing/Cyanosis] : no extremity clubbing/cyanosis [Soft] : abdomen soft [Non-distended] : non-distended [No Masses] : no abdominal mass palpated [No HSM] : no HSM [Normal Bowel Sounds] : normal bowel sounds [Normal Posterior Cervical Nodes] : no posterior cervical lymphadenopathy [Normal Anterior Cervical Nodes] : no anterior cervical lymphadenopathy [No CVA Tenderness] : no CVA  tenderness [No Spinal Tenderness] : no spinal tenderness [No Joint Swelling] : no joint swelling [Grossly Normal Strength/Tone] : grossly normal strength/tone [No Rash] : no rash [Coordination Grossly Intact] : coordination grossly intact [No Focal Deficits] : no focal deficits [Normal Gait] : normal gait [Deep Tendon Reflexes (DTR)] : deep tendon reflexes were 2+ and symmetric [Normal Affect] : the affect was normal [Normal Insight/Judgement] : insight and judgment were intact [de-identified] : surgical bandage removed, tolerated well, site healing well no acute signs of infection

## 2024-02-19 NOTE — HISTORY OF PRESENT ILLNESS
[FreeTextEntry1] : s/p appendectomy, diarrhea [de-identified] : 49 y/o M presents s/p emergency appendectomy in [facility] dec 3rd Bandage still in place. Of note, has recently been having right sided scrotal pain.  Since the surgery he has been having chronic diarrhea. Also had EGD performed which showed gastritis

## 2024-03-01 RX ORDER — METFORMIN HYDROCHLORIDE 1000 MG/1
1000 TABLET, COATED ORAL
Qty: 180 | Refills: 3 | Status: ACTIVE | COMMUNITY
Start: 2021-09-16 | End: 1900-01-01

## 2024-03-01 RX ORDER — FUROSEMIDE 20 MG/1
20 TABLET ORAL
Qty: 30 | Refills: 3 | Status: ACTIVE | COMMUNITY
Start: 2024-03-01 | End: 1900-01-01

## 2024-03-01 RX ORDER — METOPROLOL SUCCINATE 100 MG/1
100 TABLET, EXTENDED RELEASE ORAL DAILY
Qty: 90 | Refills: 3 | Status: ACTIVE | COMMUNITY
Start: 2021-07-14 | End: 1900-01-01

## 2024-03-01 RX ORDER — LOSARTAN POTASSIUM 100 MG/1
100 TABLET, FILM COATED ORAL
Qty: 90 | Refills: 3 | Status: ACTIVE | COMMUNITY
Start: 2021-07-14 | End: 1900-01-01

## 2024-03-01 RX ORDER — DILTIAZEM HYDROCHLORIDE 240 MG/1
240 CAPSULE, EXTENDED RELEASE ORAL
Qty: 90 | Refills: 3 | Status: ACTIVE | COMMUNITY
Start: 2021-12-20 | End: 1900-01-01

## 2024-03-01 RX ORDER — ASPIRIN ENTERIC COATED TABLETS 81 MG 81 MG/1
81 TABLET, DELAYED RELEASE ORAL
Qty: 90 | Refills: 1 | Status: ACTIVE | COMMUNITY
Start: 2021-07-14 | End: 1900-01-01

## 2024-04-03 ENCOUNTER — APPOINTMENT (OUTPATIENT)
Dept: INTERNAL MEDICINE | Facility: CLINIC | Age: 49
End: 2024-04-03
Payer: COMMERCIAL

## 2024-04-03 VITALS
OXYGEN SATURATION: 96 % | SYSTOLIC BLOOD PRESSURE: 156 MMHG | DIASTOLIC BLOOD PRESSURE: 93 MMHG | HEART RATE: 73 BPM | HEIGHT: 68 IN | WEIGHT: 275 LBS | TEMPERATURE: 98 F | BODY MASS INDEX: 41.68 KG/M2

## 2024-04-03 DIAGNOSIS — K21.9 GASTRO-ESOPHAGEAL REFLUX DISEASE W/OUT ESOPHAGITIS: ICD-10-CM

## 2024-04-03 DIAGNOSIS — Z12.11 ENCOUNTER FOR SCREENING FOR MALIGNANT NEOPLASM OF COLON: ICD-10-CM

## 2024-04-03 DIAGNOSIS — I50.30 UNSPECIFIED DIASTOLIC (CONGESTIVE) HEART FAILURE: ICD-10-CM

## 2024-04-03 DIAGNOSIS — E11.9 TYPE 2 DIABETES MELLITUS W/OUT COMPLICATIONS: ICD-10-CM

## 2024-04-03 DIAGNOSIS — K52.9 NONINFECTIVE GASTROENTERITIS AND COLITIS, UNSPECIFIED: ICD-10-CM

## 2024-04-03 DIAGNOSIS — K29.70 GASTRITIS, UNSPECIFIED, W/OUT BLEEDING: ICD-10-CM

## 2024-04-03 DIAGNOSIS — R10.9 UNSPECIFIED ABDOMINAL PAIN: ICD-10-CM

## 2024-04-03 PROCEDURE — 99204 OFFICE O/P NEW MOD 45 MIN: CPT

## 2024-04-03 RX ORDER — PANTOPRAZOLE 40 MG/1
40 TABLET, DELAYED RELEASE ORAL
Qty: 90 | Refills: 3 | Status: ACTIVE | COMMUNITY
Start: 2024-04-03 | End: 1900-01-01

## 2024-04-03 RX ORDER — AMOXICILLIN AND CLAVULANATE POTASSIUM 875; 125 MG/1; MG/1
875-125 TABLET, COATED ORAL
Qty: 14 | Refills: 0 | Status: DISCONTINUED | COMMUNITY
Start: 2023-01-06 | End: 2024-04-03

## 2024-04-03 RX ORDER — OMEPRAZOLE 40 MG/1
40 CAPSULE, DELAYED RELEASE ORAL
Qty: 90 | Refills: 4 | Status: DISCONTINUED | COMMUNITY
Start: 2021-07-14 | End: 2024-04-03

## 2024-04-03 RX ORDER — GABAPENTIN 100 MG/1
100 CAPSULE ORAL DAILY
Qty: 30 | Refills: 0 | Status: DISCONTINUED | COMMUNITY
Start: 2023-06-22 | End: 2024-04-03

## 2024-04-03 RX ORDER — OMEPRAZOLE 40 MG/1
40 CAPSULE, DELAYED RELEASE ORAL DAILY
Qty: 30 | Refills: 0 | Status: DISCONTINUED | COMMUNITY
Start: 2021-08-30 | End: 2024-04-03

## 2024-04-03 RX ORDER — TAMSULOSIN HYDROCHLORIDE 0.4 MG/1
0.4 CAPSULE ORAL
Qty: 90 | Refills: 1 | Status: DISCONTINUED | COMMUNITY
Start: 2021-07-14 | End: 2024-04-03

## 2024-04-03 RX ORDER — METHYLPREDNISOLONE 4 MG/1
4 TABLET ORAL
Qty: 1 | Refills: 0 | Status: DISCONTINUED | COMMUNITY
Start: 2023-06-22 | End: 2024-04-03

## 2024-04-03 RX ORDER — EMPAGLIFLOZIN 25 MG/1
25 TABLET, FILM COATED ORAL
Qty: 90 | Refills: 3 | Status: DISCONTINUED | COMMUNITY
Start: 2022-09-23 | End: 2024-04-03

## 2024-04-03 RX ORDER — HYDROCHLOROTHIAZIDE 25 MG/1
25 TABLET ORAL
Qty: 90 | Refills: 1 | Status: DISCONTINUED | COMMUNITY
Start: 2021-09-07 | End: 2024-04-03

## 2024-04-03 RX ORDER — ESOMEPRAZOLE MAGNESIUM 40 MG/1
40 CAPSULE, DELAYED RELEASE ORAL
Refills: 0 | Status: ACTIVE | COMMUNITY

## 2024-04-03 RX ORDER — OMEPRAZOLE 40 MG/1
40 CAPSULE, DELAYED RELEASE ORAL
Qty: 90 | Refills: 1 | Status: DISCONTINUED | COMMUNITY
Start: 2021-09-07 | End: 2024-04-03
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
## 2024-04-03 NOTE — PHYSICAL EXAM
[Alert] : alert [Normal Voice/Communication] : normal voice/communication [Healthy Appearing] : healthy appearing [No Acute Distress] : no acute distress [Sclera] : the sclera and conjunctiva were normal [Hearing Threshold Finger Rub Not [place]] : hearing was normal [Normal Lips/Gums] : the lips and gums were normal [Oropharynx] : the oropharynx was normal [Normal Appearance] : the appearance of the neck was normal [No Neck Mass] : no neck mass was observed [No Respiratory Distress] : no respiratory distress [No Acc Muscle Use] : no accessory muscle use [Respiration, Rhythm And Depth] : normal respiratory rhythm and effort [Auscultation Breath Sounds / Voice Sounds] : lungs were clear to auscultation bilaterally [Heart Rate And Rhythm] : heart rate was normal and rhythm regular [Normal S1, S2] : normal S1 and S2 [Murmurs] : no murmurs [Bowel Sounds] : normal bowel sounds [Abdomen Tenderness] : non-tender [No Masses] : no abdominal mass palpated [Abdomen Soft] : soft [] : no hepatosplenomegaly [Oriented To Time, Place, And Person] : oriented to person, place, and time

## 2024-05-16 ENCOUNTER — NON-APPOINTMENT (OUTPATIENT)
Age: 49
End: 2024-05-16

## 2024-05-16 ENCOUNTER — APPOINTMENT (OUTPATIENT)
Dept: INTERNAL MEDICINE | Facility: CLINIC | Age: 49
End: 2024-05-16
Payer: COMMERCIAL

## 2024-05-16 VITALS
TEMPERATURE: 96.9 F | BODY MASS INDEX: 41.83 KG/M2 | HEIGHT: 68 IN | SYSTOLIC BLOOD PRESSURE: 160 MMHG | HEART RATE: 76 BPM | OXYGEN SATURATION: 96 % | DIASTOLIC BLOOD PRESSURE: 92 MMHG | WEIGHT: 276 LBS | RESPIRATION RATE: 18 BRPM

## 2024-05-16 DIAGNOSIS — I10 ESSENTIAL (PRIMARY) HYPERTENSION: ICD-10-CM

## 2024-05-16 PROCEDURE — 99213 OFFICE O/P EST LOW 20 MIN: CPT

## 2024-05-16 NOTE — HISTORY OF PRESENT ILLNESS
[FreeTextEntry1] : Follow-up [de-identified] : Patient presents for follow-up of obesity.  In preparation for bariatric surgery.  Needs support letter from PCP

## 2024-05-16 NOTE — ASSESSMENT
[FreeTextEntry1] : Obesity awaiting bariatric surgery Hypertension still not at goal sees cardiology in the process of adjusting meds. Follow-up 1 month

## 2024-05-19 RX ORDER — SODIUM CHLORIDE 9 MG/ML
500 INJECTION, SOLUTION INTRAVENOUS
Refills: 0 | Status: DISCONTINUED | OUTPATIENT
Start: 2024-05-19 | End: 2024-06-04

## 2024-05-21 ENCOUNTER — APPOINTMENT (OUTPATIENT)
Dept: INTERNAL MEDICINE | Facility: HOSPITAL | Age: 49
End: 2024-05-21
Payer: COMMERCIAL

## 2024-05-21 ENCOUNTER — OUTPATIENT (OUTPATIENT)
Dept: OUTPATIENT SERVICES | Facility: HOSPITAL | Age: 49
LOS: 1 days | Discharge: HOME | End: 2024-05-21
Payer: COMMERCIAL

## 2024-05-21 ENCOUNTER — RESULT REVIEW (OUTPATIENT)
Age: 49
End: 2024-05-21

## 2024-05-21 VITALS
HEART RATE: 62 BPM | RESPIRATION RATE: 17 BRPM | SYSTOLIC BLOOD PRESSURE: 151 MMHG | OXYGEN SATURATION: 96 % | TEMPERATURE: 97 F | DIASTOLIC BLOOD PRESSURE: 95 MMHG

## 2024-05-21 VITALS
OXYGEN SATURATION: 95 % | SYSTOLIC BLOOD PRESSURE: 141 MMHG | RESPIRATION RATE: 18 BRPM | DIASTOLIC BLOOD PRESSURE: 94 MMHG | HEART RATE: 64 BPM

## 2024-05-21 DIAGNOSIS — Z12.11 ENCOUNTER FOR SCREENING FOR MALIGNANT NEOPLASM OF COLON: ICD-10-CM

## 2024-05-21 LAB
GLUCOSE BLDC GLUCOMTR-MCNC: 218 MG/DL — HIGH (ref 70–99)
GLUCOSE BLDC GLUCOMTR-MCNC: 226 MG/DL — HIGH (ref 70–99)

## 2024-05-21 PROCEDURE — 43239 EGD BIOPSY SINGLE/MULTIPLE: CPT

## 2024-05-21 PROCEDURE — 45380 COLONOSCOPY AND BIOPSY: CPT

## 2024-05-21 PROCEDURE — 88305 TISSUE EXAM BY PATHOLOGIST: CPT | Mod: 26

## 2024-05-21 RX ORDER — LOSARTAN POTASSIUM 100 MG/1
1 TABLET, FILM COATED ORAL
Refills: 0 | DISCHARGE

## 2024-05-21 RX ORDER — FUROSEMIDE 40 MG
1 TABLET ORAL
Refills: 0 | DISCHARGE

## 2024-05-21 RX ORDER — ASPIRIN/CALCIUM CARB/MAGNESIUM 324 MG
1 TABLET ORAL
Refills: 0 | DISCHARGE

## 2024-05-21 RX ORDER — DILTIAZEM HCL 120 MG
1 CAPSULE, EXT RELEASE 24 HR ORAL
Refills: 0 | DISCHARGE

## 2024-05-21 RX ORDER — POTASSIUM CHLORIDE 20 MEQ
1 PACKET (EA) ORAL
Refills: 0 | DISCHARGE

## 2024-05-21 RX ORDER — PANTOPRAZOLE SODIUM 20 MG/1
1 TABLET, DELAYED RELEASE ORAL
Refills: 0 | DISCHARGE

## 2024-05-21 RX ORDER — METOPROLOL TARTRATE 50 MG
1 TABLET ORAL
Refills: 0 | DISCHARGE

## 2024-05-21 RX ORDER — METFORMIN HYDROCHLORIDE 850 MG/1
1 TABLET ORAL
Refills: 0 | DISCHARGE

## 2024-05-24 LAB — SURGICAL PATHOLOGY STUDY: SIGNIFICANT CHANGE UP

## 2024-07-18 ENCOUNTER — APPOINTMENT (OUTPATIENT)
Dept: INTERNAL MEDICINE | Facility: CLINIC | Age: 49
End: 2024-07-18
Payer: COMMERCIAL

## 2024-07-18 VITALS
BODY MASS INDEX: 41.83 KG/M2 | RESPIRATION RATE: 18 BRPM | WEIGHT: 276 LBS | HEIGHT: 68 IN | OXYGEN SATURATION: 96 % | DIASTOLIC BLOOD PRESSURE: 92 MMHG | SYSTOLIC BLOOD PRESSURE: 140 MMHG | TEMPERATURE: 97.2 F | HEART RATE: 80 BPM

## 2024-07-18 DIAGNOSIS — I10 ESSENTIAL (PRIMARY) HYPERTENSION: ICD-10-CM

## 2024-07-18 DIAGNOSIS — I50.30 UNSPECIFIED DIASTOLIC (CONGESTIVE) HEART FAILURE: ICD-10-CM

## 2024-07-18 DIAGNOSIS — E11.9 TYPE 2 DIABETES MELLITUS W/OUT COMPLICATIONS: ICD-10-CM

## 2024-07-18 DIAGNOSIS — K21.9 GASTRO-ESOPHAGEAL REFLUX DISEASE W/OUT ESOPHAGITIS: ICD-10-CM

## 2024-07-18 LAB
GLUCOSE BLDC GLUCOMTR-MCNC: 270
HBA1C MFR BLD HPLC: 11.7

## 2024-07-18 PROCEDURE — 83036 HEMOGLOBIN GLYCOSYLATED A1C: CPT | Mod: QW

## 2024-07-18 PROCEDURE — 99214 OFFICE O/P EST MOD 30 MIN: CPT | Mod: 25

## 2024-07-18 PROCEDURE — 36415 COLL VENOUS BLD VENIPUNCTURE: CPT

## 2024-07-18 PROCEDURE — 82962 GLUCOSE BLOOD TEST: CPT

## 2024-07-18 PROCEDURE — 99401 PREV MED CNSL INDIV APPRX 15: CPT

## 2024-07-18 RX ORDER — EMPAGLIFLOZIN 25 MG/1
25 TABLET, FILM COATED ORAL
Qty: 90 | Refills: 1 | Status: ACTIVE | COMMUNITY
Start: 2024-07-18 | End: 1900-01-01

## 2024-07-20 DIAGNOSIS — R79.89 OTHER SPECIFIED ABNORMAL FINDINGS OF BLOOD CHEMISTRY: ICD-10-CM

## 2024-07-20 DIAGNOSIS — E78.5 HYPERLIPIDEMIA, UNSPECIFIED: ICD-10-CM

## 2024-07-20 LAB
ALBUMIN SERPL ELPH-MCNC: 4.4 G/DL
ALP BLD-CCNC: 196 U/L
ALT SERPL-CCNC: 126 U/L
ANION GAP SERPL CALC-SCNC: 19 MMOL/L
AST SERPL-CCNC: 72 U/L
BILIRUB SERPL-MCNC: 0.4 MG/DL
BUN SERPL-MCNC: 17 MG/DL
CALCIUM SERPL-MCNC: 9.5 MG/DL
CHLORIDE SERPL-SCNC: 97 MMOL/L
CHOLEST SERPL-MCNC: 156 MG/DL
CO2 SERPL-SCNC: 21 MMOL/L
CREAT SERPL-MCNC: 0.95 MG/DL
CREAT SPEC-SCNC: 44 MG/DL
EGFR: 99 ML/MIN/1.73M2
GLUCOSE SERPL-MCNC: 292 MG/DL
HDLC SERPL-MCNC: 28 MG/DL
LDLC SERPL CALC-MCNC: 71 MG/DL
MICROALBUMIN 24H UR DL<=1MG/L-MCNC: 27.3 MG/DL
MICROALBUMIN/CREAT 24H UR-RTO: 620 MG/G
NONHDLC SERPL-MCNC: 128 MG/DL
POTASSIUM SERPL-SCNC: 4.1 MMOL/L
PROT SERPL-MCNC: 7.8 G/DL
SODIUM SERPL-SCNC: 137 MMOL/L
TRIGL SERPL-MCNC: 362 MG/DL

## 2024-08-01 ENCOUNTER — TRANSCRIPTION ENCOUNTER (OUTPATIENT)
Age: 49
End: 2024-08-01

## 2024-08-08 ENCOUNTER — APPOINTMENT (OUTPATIENT)
Dept: INTERNAL MEDICINE | Facility: CLINIC | Age: 49
End: 2024-08-08

## 2024-08-08 PROCEDURE — 99401 PREV MED CNSL INDIV APPRX 15: CPT

## 2024-08-08 PROCEDURE — 99213 OFFICE O/P EST LOW 20 MIN: CPT | Mod: 25

## 2024-08-08 NOTE — HISTORY OF PRESENT ILLNESS
[FreeTextEntry1] : Follow-up [de-identified] : Patient presents for follow-up diabetes obesity.  Has been on Jardiance and metformin for the last 3 weeks lost 20 pounds with diet and medication.  Still awaiting bariatric surgery.  Recent A1c was 10.  Awaiting diabetic clearance no acute complaints

## 2024-08-08 NOTE — ASSESSMENT
[FreeTextEntry1] : Diabetes doing well on current regiment fasting blood glucose low 100s.  Continue current regiment and diet will reassess hemoglobin A1c in October.  Diabetic education discussed with the patient 8 minutes discussed with the patient to plan bariatric surgery from mid to end October.  Contacted Dr. Trinh's office bariatric surgeon and informed nurse about diabetic progress Obesity Lost 20 pounds.  Continue current regiment Follow-up early October

## 2024-09-04 ENCOUNTER — RX RENEWAL (OUTPATIENT)
Age: 49
End: 2024-09-04

## 2024-10-03 ENCOUNTER — APPOINTMENT (OUTPATIENT)
Dept: INTERNAL MEDICINE | Facility: CLINIC | Age: 49
End: 2024-10-03
Payer: COMMERCIAL

## 2024-10-03 VITALS
WEIGHT: 251 LBS | HEART RATE: 74 BPM | BODY MASS INDEX: 38.04 KG/M2 | SYSTOLIC BLOOD PRESSURE: 136 MMHG | DIASTOLIC BLOOD PRESSURE: 92 MMHG | RESPIRATION RATE: 16 BRPM | OXYGEN SATURATION: 96 % | TEMPERATURE: 96.9 F | HEIGHT: 68 IN

## 2024-10-03 DIAGNOSIS — E11.9 TYPE 2 DIABETES MELLITUS W/OUT COMPLICATIONS: ICD-10-CM

## 2024-10-03 LAB
GLUCOSE BLDC GLUCOMTR-MCNC: 113
HBA1C MFR BLD HPLC: 6.7

## 2024-10-03 PROCEDURE — 83036 HEMOGLOBIN GLYCOSYLATED A1C: CPT | Mod: QW

## 2024-10-03 PROCEDURE — 82962 GLUCOSE BLOOD TEST: CPT

## 2024-10-03 PROCEDURE — 36415 COLL VENOUS BLD VENIPUNCTURE: CPT

## 2024-10-03 PROCEDURE — 99214 OFFICE O/P EST MOD 30 MIN: CPT | Mod: 25

## 2024-10-03 NOTE — ASSESSMENT
[FreeTextEntry1] : Diabetes hemoglobin A1c done today 6.7 excellent diabetic control can proceed with the surgery pending new labs Preop clearance was cleared by cardio chest x-ray was done with the last 6 months we will repeat labs.  Patient will contact surgical office to obtain new surgical date Hypertension not at goal but okay to proceed with the surgery Obesity awaiting bariatric surgery Follow-up pending new surgical date/pending results

## 2024-10-03 NOTE — HISTORY OF PRESENT ILLNESS
[FreeTextEntry1] : Follow-up [de-identified] : Patient presents for follow-up diabetes hypertension obesity.  Patient is awaiting for glycemic control to undergo bariatric surgery no acute complaints patient has lost 25 pounds within the last 3 to 4 months

## 2024-10-05 DIAGNOSIS — Z01.818 ENCOUNTER FOR OTHER PREPROCEDURAL EXAMINATION: ICD-10-CM

## 2024-10-05 LAB
ALBUMIN SERPL ELPH-MCNC: 4.4 G/DL
ALP BLD-CCNC: 116 U/L
ALT SERPL-CCNC: 33 U/L
ANION GAP SERPL CALC-SCNC: 13 MMOL/L
APTT BLD: 33.2 SEC
AST SERPL-CCNC: 29 U/L
BASOPHILS # BLD AUTO: 0.07 K/UL
BASOPHILS NFR BLD AUTO: 0.6 %
BILIRUB SERPL-MCNC: 0.8 MG/DL
BUN SERPL-MCNC: 14 MG/DL
CALCIUM SERPL-MCNC: 9.9 MG/DL
CHLORIDE SERPL-SCNC: 101 MMOL/L
CO2 SERPL-SCNC: 23 MMOL/L
CREAT SERPL-MCNC: 1 MG/DL
EGFR: 93 ML/MIN/1.73M2
EOSINOPHIL # BLD AUTO: 0.19 K/UL
EOSINOPHIL NFR BLD AUTO: 1.7 %
GLUCOSE SERPL-MCNC: 123 MG/DL
HCT VFR BLD CALC: 50.3 %
HGB BLD-MCNC: 16.3 G/DL
IMM GRANULOCYTES NFR BLD AUTO: 0.4 %
LYMPHOCYTES # BLD AUTO: 2.67 K/UL
LYMPHOCYTES NFR BLD AUTO: 24.4 %
MAN DIFF?: NORMAL
MCHC RBC-ENTMCNC: 29.7 PG
MCHC RBC-ENTMCNC: 32.4 GM/DL
MCV RBC AUTO: 91.8 FL
MONOCYTES # BLD AUTO: 0.87 K/UL
MONOCYTES NFR BLD AUTO: 8 %
NEUTROPHILS # BLD AUTO: 7.1 K/UL
NEUTROPHILS NFR BLD AUTO: 64.9 %
PLATELET # BLD AUTO: 255 K/UL
POTASSIUM SERPL-SCNC: 4 MMOL/L
PROT SERPL-MCNC: 7.5 G/DL
RBC # BLD: 5.48 M/UL
RBC # FLD: 13.6 %
SODIUM SERPL-SCNC: 136 MMOL/L
WBC # FLD AUTO: 10.94 K/UL

## 2024-12-03 ENCOUNTER — RX RENEWAL (OUTPATIENT)
Age: 49
End: 2024-12-03

## 2025-03-26 ENCOUNTER — RX RENEWAL (OUTPATIENT)
Age: 50
End: 2025-03-26

## 2025-04-21 ENCOUNTER — RX RENEWAL (OUTPATIENT)
Age: 50
End: 2025-04-21

## 2025-04-22 ENCOUNTER — RX RENEWAL (OUTPATIENT)
Age: 50
End: 2025-04-22

## 2025-05-10 ENCOUNTER — APPOINTMENT (OUTPATIENT)
Dept: INTERNAL MEDICINE | Facility: CLINIC | Age: 50
End: 2025-05-10
Payer: COMMERCIAL

## 2025-05-10 VITALS
TEMPERATURE: 97.5 F | HEART RATE: 78 BPM | RESPIRATION RATE: 17 BRPM | BODY MASS INDEX: 38.34 KG/M2 | HEIGHT: 68 IN | OXYGEN SATURATION: 98 % | DIASTOLIC BLOOD PRESSURE: 90 MMHG | SYSTOLIC BLOOD PRESSURE: 140 MMHG | WEIGHT: 253 LBS

## 2025-05-10 DIAGNOSIS — E66.01 MORBID (SEVERE) OBESITY DUE TO EXCESS CALORIES: ICD-10-CM

## 2025-05-10 DIAGNOSIS — I10 ESSENTIAL (PRIMARY) HYPERTENSION: ICD-10-CM

## 2025-05-10 DIAGNOSIS — K21.9 GASTRO-ESOPHAGEAL REFLUX DISEASE W/OUT ESOPHAGITIS: ICD-10-CM

## 2025-05-10 DIAGNOSIS — N40.0 BENIGN PROSTATIC HYPERPLASIA WITHOUT LOWER URINARY TRACT SYMPMS: ICD-10-CM

## 2025-05-10 DIAGNOSIS — E78.5 HYPERLIPIDEMIA, UNSPECIFIED: ICD-10-CM

## 2025-05-10 DIAGNOSIS — E11.9 TYPE 2 DIABETES MELLITUS W/OUT COMPLICATIONS: ICD-10-CM

## 2025-05-10 DIAGNOSIS — I50.30 UNSPECIFIED DIASTOLIC (CONGESTIVE) HEART FAILURE: ICD-10-CM

## 2025-05-10 DIAGNOSIS — R42 DIZZINESS AND GIDDINESS: ICD-10-CM

## 2025-05-10 LAB — GLUCOSE BLDC GLUCOMTR-MCNC: 133

## 2025-05-10 PROCEDURE — 99214 OFFICE O/P EST MOD 30 MIN: CPT

## 2025-05-10 PROCEDURE — 82962 GLUCOSE BLOOD TEST: CPT

## 2025-05-10 PROCEDURE — 36415 COLL VENOUS BLD VENIPUNCTURE: CPT

## 2025-05-10 PROCEDURE — 83036 HEMOGLOBIN GLYCOSYLATED A1C: CPT | Mod: QW

## 2025-05-10 RX ORDER — SEMAGLUTIDE 0.68 MG/ML
2 INJECTION, SOLUTION SUBCUTANEOUS
Qty: 1 | Refills: 5 | Status: ACTIVE | COMMUNITY
Start: 2025-05-10 | End: 1900-01-01

## 2025-05-15 LAB
ALBUMIN SERPL ELPH-MCNC: 3.9 G/DL
ALP BLD-CCNC: 157 U/L
ALT SERPL-CCNC: 37 U/L
ANION GAP SERPL CALC-SCNC: 14 MMOL/L
AST SERPL-CCNC: 24 U/L
BASOPHILS # BLD AUTO: 0.05 K/UL
BASOPHILS NFR BLD AUTO: 0.5 %
BILIRUB SERPL-MCNC: 0.3 MG/DL
BUN SERPL-MCNC: 20 MG/DL
CALCIUM SERPL-MCNC: 9 MG/DL
CHLORIDE SERPL-SCNC: 104 MMOL/L
CHOLEST SERPL-MCNC: 160 MG/DL
CO2 SERPL-SCNC: 23 MMOL/L
CREAT SERPL-MCNC: 1.04 MG/DL
CREAT SPEC-SCNC: 130 MG/DL
EGFRCR SERPLBLD CKD-EPI 2021: 88 ML/MIN/1.73M2
EOSINOPHIL # BLD AUTO: 0.36 K/UL
EOSINOPHIL NFR BLD AUTO: 3.7 %
GLUCOSE SERPL-MCNC: 151 MG/DL
HCT VFR BLD CALC: 50.5 %
HDLC SERPL-MCNC: 38 MG/DL
HGB BLD-MCNC: 16.6 G/DL
IMM GRANULOCYTES NFR BLD AUTO: 0.5 %
LDLC SERPL-MCNC: 95 MG/DL
LYMPHOCYTES # BLD AUTO: 3.07 K/UL
LYMPHOCYTES NFR BLD AUTO: 31.6 %
MAN DIFF?: NORMAL
MCHC RBC-ENTMCNC: 29 PG
MCHC RBC-ENTMCNC: 32.9 G/DL
MCV RBC AUTO: 88.1 FL
MICROALBUMIN 24H UR DL<=1MG/L-MCNC: 111 MG/DL
MICROALBUMIN/CREAT 24H UR-RTO: 852 MG/G
MONOCYTES # BLD AUTO: 0.56 K/UL
MONOCYTES NFR BLD AUTO: 5.8 %
NEUTROPHILS # BLD AUTO: 5.64 K/UL
NEUTROPHILS NFR BLD AUTO: 57.9 %
NONHDLC SERPL-MCNC: 122 MG/DL
PLATELET # BLD AUTO: 231 K/UL
POTASSIUM SERPL-SCNC: 4.2 MMOL/L
PROT SERPL-MCNC: 7.5 G/DL
RBC # BLD: 5.73 M/UL
RBC # FLD: 14 %
SODIUM SERPL-SCNC: 141 MMOL/L
TRIGL SERPL-MCNC: 157 MG/DL
WBC # FLD AUTO: 9.73 K/UL

## 2025-07-14 ENCOUNTER — RX RENEWAL (OUTPATIENT)
Age: 50
End: 2025-07-14

## 2025-07-17 ENCOUNTER — RX RENEWAL (OUTPATIENT)
Age: 50
End: 2025-07-17

## 2025-08-06 ENCOUNTER — APPOINTMENT (OUTPATIENT)
Dept: INTERNAL MEDICINE | Facility: CLINIC | Age: 50
End: 2025-08-06
Payer: COMMERCIAL

## 2025-08-06 VITALS
HEART RATE: 67 BPM | OXYGEN SATURATION: 97 % | TEMPERATURE: 97.3 F | WEIGHT: 257 LBS | SYSTOLIC BLOOD PRESSURE: 142 MMHG | DIASTOLIC BLOOD PRESSURE: 88 MMHG | RESPIRATION RATE: 17 BRPM | HEIGHT: 68 IN | BODY MASS INDEX: 38.95 KG/M2

## 2025-08-06 DIAGNOSIS — S43.401A UNSPECIFIED SPRAIN OF RIGHT SHOULDER JOINT, INITIAL ENCOUNTER: ICD-10-CM

## 2025-08-06 DIAGNOSIS — I50.30 UNSPECIFIED DIASTOLIC (CONGESTIVE) HEART FAILURE: ICD-10-CM

## 2025-08-06 DIAGNOSIS — E78.5 HYPERLIPIDEMIA, UNSPECIFIED: ICD-10-CM

## 2025-08-06 DIAGNOSIS — N40.0 BENIGN PROSTATIC HYPERPLASIA WITHOUT LOWER URINARY TRACT SYMPMS: ICD-10-CM

## 2025-08-06 DIAGNOSIS — E66.01 MORBID (SEVERE) OBESITY DUE TO EXCESS CALORIES: ICD-10-CM

## 2025-08-06 DIAGNOSIS — I10 ESSENTIAL (PRIMARY) HYPERTENSION: ICD-10-CM

## 2025-08-06 DIAGNOSIS — E11.9 TYPE 2 DIABETES MELLITUS W/OUT COMPLICATIONS: ICD-10-CM

## 2025-08-06 PROCEDURE — 99214 OFFICE O/P EST MOD 30 MIN: CPT | Mod: 25

## 2025-08-06 PROCEDURE — 96372 THER/PROPH/DIAG INJ SC/IM: CPT

## 2025-08-06 RX ORDER — DICLOFENAC SODIUM AND MISOPROSTOL 75; 200 MG/1; UG/1
75-0.2 TABLET, DELAYED RELEASE ORAL
Qty: 40 | Refills: 0 | Status: ACTIVE | COMMUNITY
Start: 2025-08-06 | End: 1900-01-01

## 2025-08-06 RX ORDER — BLOOD-GLUCOSE,RECEIVER,CONT
EACH MISCELLANEOUS
Qty: 1 | Refills: 4 | Status: ACTIVE | COMMUNITY
Start: 2025-08-06 | End: 1900-01-01

## 2025-08-06 RX ORDER — BLOOD-GLUCOSE SENSOR
EACH MISCELLANEOUS
Qty: 4 | Refills: 4 | Status: ACTIVE | COMMUNITY
Start: 2025-08-06 | End: 1900-01-01

## 2025-08-06 RX ORDER — DICLOFENAC SODIUM 10 MG/G
1 GEL TOPICAL
Qty: 1 | Refills: 2 | Status: ACTIVE | COMMUNITY
Start: 2025-08-06 | End: 1900-01-01

## 2025-08-06 RX ORDER — TIRZEPATIDE 5 MG/.5ML
5 INJECTION, SOLUTION SUBCUTANEOUS
Qty: 1 | Refills: 1 | Status: ACTIVE | COMMUNITY
Start: 2025-08-06 | End: 1900-01-01

## 2025-08-06 RX ORDER — KETOROLAC TROMETHAMINE 30 MG/ML
30 INJECTION, SOLUTION INTRAMUSCULAR; INTRAVENOUS
Qty: 1 | Refills: 0 | Status: COMPLETED | OUTPATIENT
Start: 2025-08-06

## 2025-08-06 RX ADMIN — KETOROLAC TROMETHAMINE 0 MG/ML: 30 INJECTION, SOLUTION INTRAMUSCULAR; INTRAVENOUS at 00:00

## 2025-08-28 ENCOUNTER — APPOINTMENT (OUTPATIENT)
Dept: INTERNAL MEDICINE | Facility: CLINIC | Age: 50
End: 2025-08-28
Payer: COMMERCIAL

## 2025-08-28 VITALS
SYSTOLIC BLOOD PRESSURE: 138 MMHG | HEIGHT: 68 IN | RESPIRATION RATE: 17 BRPM | OXYGEN SATURATION: 95 % | TEMPERATURE: 97 F | DIASTOLIC BLOOD PRESSURE: 86 MMHG | HEART RATE: 54 BPM | WEIGHT: 255 LBS | BODY MASS INDEX: 38.65 KG/M2

## 2025-08-28 DIAGNOSIS — N52.9 MALE ERECTILE DYSFUNCTION, UNSPECIFIED: ICD-10-CM

## 2025-08-28 DIAGNOSIS — S49.91XD UNSPECIFIED INJURY OF RIGHT SHOULDER AND UPPER ARM, SUBSEQUENT ENCOUNTER: ICD-10-CM

## 2025-08-28 DIAGNOSIS — E11.21 TYPE 2 DIABETES MELLITUS WITH DIABETIC NEPHROPATHY: ICD-10-CM

## 2025-08-28 DIAGNOSIS — N52.2 DRUG-INDUCED ERECTILE DYSFUNCTION: ICD-10-CM

## 2025-08-28 DIAGNOSIS — E11.9 TYPE 2 DIABETES MELLITUS W/OUT COMPLICATIONS: ICD-10-CM

## 2025-08-28 DIAGNOSIS — I50.30 UNSPECIFIED DIASTOLIC (CONGESTIVE) HEART FAILURE: ICD-10-CM

## 2025-08-28 PROCEDURE — 99214 OFFICE O/P EST MOD 30 MIN: CPT

## 2025-08-28 PROCEDURE — G2211 COMPLEX E/M VISIT ADD ON: CPT | Mod: NC

## 2025-08-28 PROCEDURE — 36415 COLL VENOUS BLD VENIPUNCTURE: CPT

## 2025-08-28 RX ORDER — SEMAGLUTIDE 1.34 MG/ML
4 INJECTION, SOLUTION SUBCUTANEOUS
Qty: 3 | Refills: 4 | Status: ACTIVE | COMMUNITY
Start: 2025-08-28 | End: 1900-01-01

## 2025-08-28 RX ORDER — TADALAFIL 10 MG/1
10 TABLET, FILM COATED ORAL
Qty: 90 | Refills: 1 | Status: ACTIVE | COMMUNITY
Start: 2025-08-28 | End: 1900-01-01

## 2025-08-28 RX ORDER — DAPAGLIFLOZIN 5 MG/1
5 TABLET, FILM COATED ORAL DAILY
Qty: 90 | Refills: 0 | Status: DISCONTINUED | COMMUNITY
Start: 2025-08-28 | End: 2025-08-28

## 2025-08-29 LAB
ALBUMIN SERPL ELPH-MCNC: 4.5 G/DL
ALBUMIN, RANDOM URINE: 62.5 MG/DL
ALP BLD-CCNC: 151 U/L
ALT SERPL-CCNC: 45 U/L
ANION GAP SERPL CALC-SCNC: 14 MMOL/L
AST SERPL-CCNC: 36 U/L
BASOPHILS # BLD AUTO: 0.05 K/UL
BASOPHILS NFR BLD AUTO: 0.6 %
BILIRUB SERPL-MCNC: 0.5 MG/DL
BUN SERPL-MCNC: 21 MG/DL
CALCIUM SERPL-MCNC: 9.4 MG/DL
CHLORIDE SERPL-SCNC: 103 MMOL/L
CO2 SERPL-SCNC: 22 MMOL/L
CREAT SERPL-MCNC: 1.13 MG/DL
CREAT SPEC-SCNC: 91 MG/DL
EGFRCR SERPLBLD CKD-EPI 2021: 80 ML/MIN/1.73M2
EOSINOPHIL # BLD AUTO: 0.21 K/UL
EOSINOPHIL NFR BLD AUTO: 2.5 %
ESTIMATED AVERAGE GLUCOSE: 171 MG/DL
GLUCOSE SERPL-MCNC: 243 MG/DL
HBA1C MFR BLD HPLC: 7.6 %
HCT VFR BLD CALC: 50.1 %
HGB BLD-MCNC: 16 G/DL
IMM GRANULOCYTES NFR BLD AUTO: 0.5 %
LYMPHOCYTES # BLD AUTO: 2.52 K/UL
LYMPHOCYTES NFR BLD AUTO: 30.3 %
MAN DIFF?: NORMAL
MCHC RBC-ENTMCNC: 28.7 PG
MCHC RBC-ENTMCNC: 31.9 G/DL
MCV RBC AUTO: 89.8 FL
MICROALBUMIN/CREAT 24H UR-RTO: 687 MG/G
MONOCYTES # BLD AUTO: 0.45 K/UL
MONOCYTES NFR BLD AUTO: 5.4 %
NEUTROPHILS # BLD AUTO: 5.04 K/UL
NEUTROPHILS NFR BLD AUTO: 60.7 %
PLATELET # BLD AUTO: 231 K/UL
POTASSIUM SERPL-SCNC: 4.1 MMOL/L
PROT SERPL-MCNC: 7.6 G/DL
RBC # BLD: 5.58 M/UL
RBC # FLD: 13.8 %
SODIUM SERPL-SCNC: 138 MMOL/L
WBC # FLD AUTO: 8.31 K/UL

## 2025-09-23 PROBLEM — M75.81 TENDINITIS OF RIGHT ROTATOR CUFF: Status: ACTIVE | Noted: 2025-09-23

## (undated) DEVICE — ELCTR GROUNDING PAD ADULT COVIDIEN

## (undated) DEVICE — BIOPSY FORCEP COLD DISP

## (undated) DEVICE — FORCEP RADIAL JAW 4 JUMBO 2.8MM 3.2MM 240CM ORANGE DISP

## (undated) DEVICE — BASIN EMESIS 10IN GRADUATED MAUVE

## (undated) DEVICE — DRSG 2X2

## (undated) DEVICE — CONTAINER FORMALIN 80ML YELLOW

## (undated) DEVICE — ELCTR ECG CONDUCTIVE ADHESIVE

## (undated) DEVICE — LUBRICATING JELLY HR ONE SHOT 3G

## (undated) DEVICE — CATH IV SAFE BC 22G X 1" (BLUE)

## (undated) DEVICE — TUBING IV SET GRAVITY 3Y 100" MACRO

## (undated) DEVICE — BIOPSY FORCEP RADIAL JAW 4 STANDARD WITH NEEDLE

## (undated) DEVICE — SALIVA EJECTOR (BLUE)

## (undated) DEVICE — DRSG CURITY GAUZE SPONGE 4 X 4" 12-PLY NON-STERILE

## (undated) DEVICE — GOWN LG

## (undated) DEVICE — DRSG BANDAID 0.75X3"

## (undated) DEVICE — TUBING SUCTION NONCONDUCTIVE 6MM X 12FT

## (undated) DEVICE — PACK IV START WITH CHG

## (undated) DEVICE — TUBING MEDI-VAC W MAXIGRIP CONNECTORS 1/4"X6'

## (undated) DEVICE — CONTAINER FORMALIN 10% 20ML